# Patient Record
Sex: FEMALE | Race: WHITE | NOT HISPANIC OR LATINO | Employment: PART TIME | URBAN - METROPOLITAN AREA
[De-identification: names, ages, dates, MRNs, and addresses within clinical notes are randomized per-mention and may not be internally consistent; named-entity substitution may affect disease eponyms.]

---

## 2021-01-05 ENCOUNTER — TELEPHONE (OUTPATIENT)
Dept: FAMILY MEDICINE CLINIC | Facility: CLINIC | Age: 66
End: 2021-01-05

## 2021-01-05 DIAGNOSIS — Z12.12 SCREENING FOR COLORECTAL CANCER: Primary | ICD-10-CM

## 2021-01-05 DIAGNOSIS — Z12.11 SCREENING FOR COLORECTAL CANCER: Primary | ICD-10-CM

## 2021-01-05 NOTE — TELEPHONE ENCOUNTER
Spouse called in requesting a referral to have a colonoscopy done for the patient.    Spouse also said she needs to have the colonoscopy done before the ending of February. Spouse would like his and her colonoscopy done the same day.    Please call back to advise.    Best call back # [phone]

## 2023-09-15 ENCOUNTER — TELEPHONE (OUTPATIENT)
Dept: FAMILY MEDICINE CLINIC | Facility: CLINIC | Age: 68
End: 2023-09-15

## 2023-09-15 NOTE — TELEPHONE ENCOUNTER
"    Caller: Krystina Justin \"LALITA\"    Relationship to patient: Self    Best call back number: [phone]    Patient is needing: PATIENT IS RETURNING CALL ABOUT THE FORM SHE DROPPED OFF YESTERDAY, SHE STATES THAT THERE IS A FAX NUMBER ON THE BOTTOM OF THE FORM.         "

## 2024-04-11 ENCOUNTER — TELEPHONE (OUTPATIENT)
Dept: OBSTETRICS AND GYNECOLOGY | Age: 69
End: 2024-04-11

## 2024-04-11 DIAGNOSIS — Z12.31 VISIT FOR SCREENING MAMMOGRAM: Primary | ICD-10-CM

## 2024-04-17 RX ORDER — VENLAFAXINE 75 MG/1
75 TABLET ORAL EVERY EVENING
Qty: 90 TABLET | Refills: 0 | Status: SHIPPED | OUTPATIENT
Start: 2024-04-17

## 2024-04-17 NOTE — TELEPHONE ENCOUNTER
"    Caller: Krystina Justin \"LALITA\"    Relationship: Self    Best call back number: [phone]     Requested Prescriptions:   Requested Prescriptions     Pending Prescriptions Disp Refills    venlafaxine (EFFEXOR) 75 MG tablet       Sig: Take 1 tablet by mouth Every Evening.        Pharmacy where request should be sent: [facility]/PHARMACY #[id number] - [address] [phone] [facility] [phone]      Last office visit with prescribing clinician: 4/17/2023   Last telemedicine visit with prescribing clinician: Visit date not found   Next office visit with prescribing clinician: Visit date not found     Additional details provided by patient: PATIENT WAS GETTING FROM HER GYNECOLOGIST AND THEY ARE RETIRED. SHE HAS TWO WEEKS OF MEDICATION LEFT AND WILL NOT SEE NEW GYNECOLOGIST UNTIL JULY. SHE WOULD LIKE TO KNOW IF PCP WILL FILL FOR HER. PLEASE CALL IF SHE NEEDS APPT.     Does the patient have less than a 3 day supply:  [] Yes  [x] No    Would you like a call back once the refill request has been completed: [] Yes [x] No    If the office needs to give you a call back, can they leave a voicemail: [] Yes [x] No    Desmond Alas Rep   04/17/24 10:02 EDT         "

## 2024-05-09 ENCOUNTER — APPOINTMENT (OUTPATIENT)
Dept: WOMENS IMAGING | Facility: HOSPITAL | Age: 69
End: 2024-05-09
Payer: MEDICARE

## 2024-05-09 ENCOUNTER — HOSPITAL ENCOUNTER (OUTPATIENT)
Facility: HOSPITAL | Age: 69
Discharge: HOME | End: 2024-05-09
Payer: MEDICARE

## 2024-05-09 PROCEDURE — 77063 BREAST TOMOSYNTHESIS BI: CPT | Performed by: RADIOLOGY

## 2024-05-09 PROCEDURE — 77067 SCR MAMMO BI INCL CAD: CPT | Performed by: RADIOLOGY

## 2024-07-09 RX ORDER — VENLAFAXINE 75 MG/1
75 TABLET ORAL EVERY EVENING
Qty: 90 TABLET | Refills: 0 | Status: SHIPPED | OUTPATIENT
Start: 2024-07-09

## 2024-07-24 ENCOUNTER — OFFICE VISIT (OUTPATIENT)
Dept: OBSTETRICS AND GYNECOLOGY | Age: 69
End: 2024-07-24
Payer: MEDICARE

## 2024-07-24 VITALS
BODY MASS INDEX: 29.86 KG/M2 | DIASTOLIC BLOOD PRESSURE: 72 MMHG | SYSTOLIC BLOOD PRESSURE: 124 MMHG | HEIGHT: 68 IN | WEIGHT: 197 LBS

## 2024-07-24 DIAGNOSIS — Z01.419 WELL WOMAN EXAM WITH ROUTINE GYNECOLOGICAL EXAM: Primary | ICD-10-CM

## 2024-07-24 DIAGNOSIS — Z78.0 POSTMENOPAUSAL: ICD-10-CM

## 2024-07-24 DIAGNOSIS — Z12.4 SCREENING FOR CERVICAL CANCER: ICD-10-CM

## 2024-07-24 DIAGNOSIS — Z12.31 ENCOUNTER FOR SCREENING MAMMOGRAM FOR MALIGNANT NEOPLASM OF BREAST: ICD-10-CM

## 2024-07-24 DIAGNOSIS — N39.3 STRESS INCONTINENCE: ICD-10-CM

## 2024-07-24 DIAGNOSIS — Z85.3 PERSONAL HISTORY OF BREAST CANCER: ICD-10-CM

## 2024-07-24 NOTE — PROGRESS NOTES
Subjective     Chief Complaint   Patient presents with    Gynecologic Exam     New GYN, AE, last pap  normal, mg 2024, csy 2024, no dexa  Cc:  cyst inside vaginal, urine incontinent       History of Present Illness    Krystina Justin is a 69 y.o.  who presents for annual exam.    New  Mammogram UTD  She is postmenopausal - no vaginal bleeding  She has personal hx of breast cancer in , had negative genetic testing  Was getting pap every year   Has never had a dexa but takes calcium and vitamin D. Does not exercise regularly.   She noticed a bump on labia 2 days ago, not really painful  Has hx of KAYA. Has had for years, wears panty liner. Saw urology and they wanted to do a procedure elkin her but she didn't want to. Declines PT.    Obstetric History:  OB History          3    Para   3    Term   3            AB        Living             SAB        IAB        Ectopic        Molar        Multiple        Live Births                   Menstrual History:     No LMP recorded. Patient is postmenopausal.         Current contraception: post menopausal status  History of abnormal Pap smear: no  Received Gardasil immunization: no  Perform regular self breast exam: yes - .  Family history of uterine or ovarian cancer: no  Family History of colon cancer: no  Family history of breast cancer: yes - mother    Mammogram: up to date.  Colonoscopy: up to date.  DEXA: up to date.    Exercise: moderately active  Calcium/Vitamin D: uses supplements    The following portions of the patient's history were reviewed and updated as appropriate: allergies, current medications, past family history, past medical history, past social history, past surgical history, and problem list.    Review of Systems   Constitutional: Negative.    Respiratory: Negative.     Cardiovascular: Negative.    Gastrointestinal: Negative.    Genitourinary: Negative.         KAYA   Skin: Negative.         Vaginal bump    Psychiatric/Behavioral: Negative.             Objective   Physical Exam  Constitutional:       General: She is awake.      Appearance: Normal appearance. She is well-developed.   HENT:      Head: Normocephalic and atraumatic.      Nose: Nose normal.   Neck:      Thyroid: No thyroid mass, thyromegaly or thyroid tenderness.   Cardiovascular:      Rate and Rhythm: Normal rate and regular rhythm.      Pulses: Normal pulses.      Heart sounds: Normal heart sounds.   Pulmonary:      Effort: Pulmonary effort is normal.      Breath sounds: Normal breath sounds.   Chest:   Breasts:     Breasts are symmetrical.      Right: Normal. No swelling, bleeding, inverted nipple, mass, nipple discharge, skin change or tenderness.      Left: Normal. No swelling, bleeding, inverted nipple, mass, nipple discharge, skin change or tenderness.   Abdominal:      General: Abdomen is flat. Bowel sounds are normal.      Palpations: Abdomen is soft.      Tenderness: There is no abdominal tenderness.   Genitourinary:     General: Normal vulva.      Labia:         Right: No rash, tenderness, lesion or injury.         Left: No rash, tenderness, lesion or injury.       Urethra: No prolapse, urethral pain, urethral swelling or urethral lesion.      Vagina: Normal. No signs of injury. No vaginal discharge, erythema, tenderness, bleeding, lesions or prolapsed vaginal walls.      Cervix: Normal. No discharge, friability, lesion, erythema or cervical bleeding.      Uterus: Normal. Not enlarged, not tender and no uterine prolapse.       Adnexa: Right adnexa normal and left adnexa normal.        Right: No mass, tenderness or fullness.          Left: No mass, tenderness or fullness.        Rectum: Normal. No mass.          Comments: Small pimple, scant pus expressed.   Musculoskeletal:      Cervical back: Normal range of motion and neck supple.   Lymphadenopathy:      Upper Body:      Right upper body: No supraclavicular adenopathy.      Left upper body:  "No supraclavicular adenopathy.   Skin:     General: Skin is warm and dry.   Neurological:      General: No focal deficit present.      Mental Status: She is alert and oriented to person, place, and time.   Psychiatric:         Mood and Affect: Mood normal.         Behavior: Behavior normal. Behavior is cooperative.         Thought Content: Thought content normal.         Judgment: Judgment normal.         /72   Ht 172.7 cm (68\")   Wt 89.4 kg (197 lb)   BMI 29.95 kg/m²     Assessment & Plan   Diagnoses and all orders for this visit:    1. Well woman exam with routine gynecological exam (Primary)    2. Postmenopausal  -     DEXA Bone Density Axial; Future    3. Screening for cervical cancer  -     IGP, Apt HPV,rfx 16 / 18,45    4. Personal history of breast cancer    5. Encounter for screening mammogram for malignant neoplasm of breast  -     Mammo Screening Digital Tomosynthesis Bilateral With CAD; Future    6. Stress incontinence        All questions answered.  Breast self exam technique reviewed and patient encouraged to perform self-exam monthly.  Discussed healthy lifestyle modifications.  Recommended 30 minutes of aerobic exercise five times per week.  Discussed calcium needs to prevent osteoporosis.    -Pap done  -Apply neosporin to small pimple, appears to be resolving, follow up if persists/worsens  -Dexa ordered               "

## 2024-07-26 LAB
CYTOLOGIST CVX/VAG CYTO: NORMAL
CYTOLOGY CVX/VAG DOC CYTO: NORMAL
CYTOLOGY CVX/VAG DOC THIN PREP: NORMAL
DX ICD CODE: NORMAL
HPV I/H RISK 4 DNA CVX QL PROBE+SIG AMP: NEGATIVE
Lab: NORMAL
OTHER STN SPEC: NORMAL
STAT OF ADQ CVX/VAG CYTO-IMP: NORMAL

## 2024-08-08 ENCOUNTER — HOSPITAL ENCOUNTER (OUTPATIENT)
Facility: HOSPITAL | Age: 69
Discharge: HOME | End: 2024-08-08
Admitting: NURSE PRACTITIONER
Payer: MEDICARE

## 2024-08-08 DIAGNOSIS — Z78.0 POSTMENOPAUSAL: ICD-10-CM

## 2024-08-08 PROCEDURE — 77080 DXA BONE DENSITY AXIAL: CPT

## 2024-09-30 DIAGNOSIS — R00.0 TACHYCARDIA: ICD-10-CM

## 2024-09-30 DIAGNOSIS — N18.9 CHRONIC KIDNEY DISEASE, UNSPECIFIED CKD STAGE: ICD-10-CM

## 2024-09-30 DIAGNOSIS — Z00.00 MEDICARE ANNUAL WELLNESS VISIT, SUBSEQUENT: Primary | ICD-10-CM

## 2024-09-30 DIAGNOSIS — E78.00 ELEVATED LDL CHOLESTEROL LEVEL: ICD-10-CM

## 2024-10-02 LAB
ALBUMIN SERPL-MCNC: 4.5 G/DL (ref 3.5–5.2)
ALBUMIN/GLOB SERPL: 1.7 G/DL
ALP SERPL-CCNC: 62 U/L (ref 39–117)
ALT SERPL-CCNC: 13 U/L (ref 1–33)
APPEARANCE UR: CLEAR
AST SERPL-CCNC: 20 U/L (ref 1–32)
BILIRUB SERPL-MCNC: 0.3 MG/DL (ref 0–1.2)
BILIRUB UR QL STRIP: NEGATIVE
BUN SERPL-MCNC: 17 MG/DL (ref 8–23)
BUN/CREAT SERPL: 18.3 (ref 7–25)
CALCIUM SERPL-MCNC: 9.7 MG/DL (ref 8.6–10.5)
CHLORIDE SERPL-SCNC: 101 MMOL/L (ref 98–107)
CHOLEST SERPL-MCNC: 206 MG/DL (ref 0–200)
CO2 SERPL-SCNC: 25.2 MMOL/L (ref 22–29)
COLOR UR: YELLOW
CREAT SERPL-MCNC: 0.93 MG/DL (ref 0.57–1)
EGFRCR SERPLBLD CKD-EPI 2021: 67.1 ML/MIN/1.73
ERYTHROCYTE [DISTWIDTH] IN BLOOD BY AUTOMATED COUNT: 13.1 % (ref 12.3–15.4)
GLOBULIN SER CALC-MCNC: 2.7 GM/DL
GLUCOSE SERPL-MCNC: 96 MG/DL (ref 65–99)
GLUCOSE UR QL STRIP: NEGATIVE
HCT VFR BLD AUTO: 40.9 % (ref 34–46.6)
HDLC SERPL-MCNC: 47 MG/DL (ref 40–60)
HGB BLD-MCNC: 13.8 G/DL (ref 12–15.9)
HGB UR QL STRIP: NEGATIVE
KETONES UR QL STRIP: NEGATIVE
LDLC SERPL CALC-MCNC: 132 MG/DL (ref 0–100)
LDLC/HDLC SERPL: 2.74 {RATIO}
LEUKOCYTE ESTERASE UR QL STRIP: NEGATIVE
MCH RBC QN AUTO: 32.5 PG (ref 26.6–33)
MCHC RBC AUTO-ENTMCNC: 33.7 G/DL (ref 31.5–35.7)
MCV RBC AUTO: 96.5 FL (ref 79–97)
NITRITE UR QL STRIP: NEGATIVE
PH UR STRIP: 6 [PH] (ref 5–8)
PLATELET # BLD AUTO: 261 10*3/MM3 (ref 140–450)
POTASSIUM SERPL-SCNC: 4.5 MMOL/L (ref 3.5–5.2)
PROT SERPL-MCNC: 7.2 G/DL (ref 6–8.5)
PROT UR QL STRIP: NEGATIVE
RBC # BLD AUTO: 4.24 10*6/MM3 (ref 3.77–5.28)
SODIUM SERPL-SCNC: 138 MMOL/L (ref 136–145)
SP GR UR STRIP: 1.01 (ref 1–1.03)
TRIGL SERPL-MCNC: 151 MG/DL (ref 0–150)
TSH SERPL DL<=0.005 MIU/L-ACNC: 3.72 UIU/ML (ref 0.27–4.2)
UROBILINOGEN UR STRIP-MCNC: NORMAL MG/DL
VLDLC SERPL CALC-MCNC: 27 MG/DL (ref 5–40)
WBC # BLD AUTO: 4.7 10*3/MM3 (ref 3.4–10.8)

## 2024-10-03 ENCOUNTER — OFFICE VISIT (OUTPATIENT)
Dept: FAMILY MEDICINE CLINIC | Facility: CLINIC | Age: 69
End: 2024-10-03
Payer: MEDICARE

## 2024-10-03 VITALS
HEIGHT: 68 IN | HEART RATE: 68 BPM | WEIGHT: 208 LBS | BODY MASS INDEX: 31.52 KG/M2 | OXYGEN SATURATION: 98 % | DIASTOLIC BLOOD PRESSURE: 70 MMHG | SYSTOLIC BLOOD PRESSURE: 120 MMHG | TEMPERATURE: 97 F

## 2024-10-03 DIAGNOSIS — F32.A ANXIETY AND DEPRESSION: ICD-10-CM

## 2024-10-03 DIAGNOSIS — Z00.00 MEDICARE ANNUAL WELLNESS VISIT, SUBSEQUENT: Primary | ICD-10-CM

## 2024-10-03 DIAGNOSIS — F41.9 ANXIETY AND DEPRESSION: ICD-10-CM

## 2024-10-03 PROBLEM — Z12.11 COLON CANCER SCREENING: Status: ACTIVE | Noted: 2023-09-26

## 2024-10-03 PROBLEM — N18.9 CHRONIC KIDNEY DISEASE: Status: RESOLVED | Noted: 2020-07-24 | Resolved: 2024-10-03

## 2024-10-03 PROBLEM — Z86.16 PERSONAL HISTORY OF COVID-19: Status: RESOLVED | Noted: 2022-04-14 | Resolved: 2024-10-03

## 2024-10-03 PROBLEM — R00.0 TACHYCARDIA: Status: RESOLVED | Noted: 2022-12-02 | Resolved: 2024-10-03

## 2024-10-03 PROCEDURE — 1160F RVW MEDS BY RX/DR IN RCRD: CPT | Performed by: FAMILY MEDICINE

## 2024-10-03 PROCEDURE — 1170F FXNL STATUS ASSESSED: CPT | Performed by: FAMILY MEDICINE

## 2024-10-03 PROCEDURE — 1159F MED LIST DOCD IN RCRD: CPT | Performed by: FAMILY MEDICINE

## 2024-10-03 PROCEDURE — G0439 PPPS, SUBSEQ VISIT: HCPCS | Performed by: FAMILY MEDICINE

## 2024-10-03 PROCEDURE — 1125F AMNT PAIN NOTED PAIN PRSNT: CPT | Performed by: FAMILY MEDICINE

## 2024-10-03 RX ORDER — VENLAFAXINE 75 MG/1
75 TABLET ORAL EVERY EVENING
Qty: 90 TABLET | Refills: 3 | Status: SHIPPED | OUTPATIENT
Start: 2024-10-03

## 2024-10-03 NOTE — PROGRESS NOTES
Subjective   The ABCs of the Annual Wellness Visit  Medicare Wellness Visit      Krystina Justin is a 69 y.o. patient who presents for a Medicare Wellness Visit.    The following portions of the patient's history were reviewed and   updated as appropriate: allergies, current medications, past family history, past medical history, past social history, past surgical history, and problem list.    Compared to one year ago, the patient's physical   health is the same.  Compared to one year ago, the patient's mental   health is the same.    Recent Hospitalizations:  She was not admitted to the hospital during the last year.     Current Medical Providers:  Patient Care Team:  Rudy Mccartney MD as PCP - General (Family Medicine)  Fabian Canada MD as Consulting Physician (Orthopedic Surgery)  Jagdeep Cheek OD (Optometry)  Vibha Castillo APRN as Nurse Practitioner (Obstetrics and Gynecology)  Raul Mcclellan MD as Consulting Physician (Orthopedic Surgery)  Osmany Ortega MD as Consulting Physician (General Surgery)  Giuliano Singh as Physical Therapy Assistant    Outpatient Medications Prior to Visit   Medication Sig Dispense Refill    calcium carbonate (OS-YOLANDA) 600 MG tablet Take 1 tablet by mouth Daily. 2 TABS EACH DOSE      MAGNESIUM PO Take  by mouth Daily.      melatonin 5 MG tablet tablet Take 1 tablet by mouth.      Misc Natural Products (GLUCOSAMINE CHONDROITIN MSM) tablet Take 2 tablets by mouth Daily. ON HOLD FOR SX 6/1/16      venlafaxine (EFFEXOR) 75 MG tablet Take 1 tablet by mouth Every Evening. 90 tablet 0     No facility-administered medications prior to visit.     No opioid medication identified on active medication list. I have reviewed chart for other potential  high risk medication/s and harmful drug interactions in the elderly.      Aspirin is not on active medication list.  Aspirin use is not indicated based on review of current medical condition/s. Risk of harm outweighs  "potential benefits.  .    Patient Active Problem List   Diagnosis    Medicare annual wellness visit, subsequent    Menopause    Atypical lobular hyperplasia of right breast    Breast cancer, left breast    Renal cyst    Family history of thyroid cancer    Colon cancer screening     Advance Care Planning Advance Directive is not on file.  ACP discussion was held with the patient during this visit. Patient has an advance directive (not in EMR), copy requested.            Objective   Vitals:    10/03/24 1033   BP: 120/70   BP Location: Left arm   Patient Position: Sitting   Cuff Size: Adult   Pulse: 68   Temp: 97 °F (36.1 °C)   SpO2: 98%   Weight: 94.3 kg (208 lb)   Height: 172.7 cm (68\")   PainSc:   2   PainLoc: Shoulder       Estimated body mass index is 31.63 kg/m² as calculated from the following:    Height as of this encounter: 172.7 cm (68\").    Weight as of this encounter: 94.3 kg (208 lb).            Does the patient have evidence of cognitive impairment? No  Lab Results   Component Value Date    CHLPL 206 (H) 10/01/2024    TRIG 151 (H) 10/01/2024    HDL 47 10/01/2024     (H) 10/01/2024    VLDL 27 10/01/2024                                                                                               Health  Risk Assessment    Smoking Status:  Social History     Tobacco Use   Smoking Status Former    Current packs/day: 0.00    Types: Cigarettes    Start date: 1970    Quit date: 1990    Years since quittin.7    Passive exposure: Yes   Smokeless Tobacco Never   Tobacco Comments    a few a month hanging out with the girls     Alcohol Consumption:  Social History     Substance and Sexual Activity   Alcohol Use Yes    Alcohol/week: 21.0 standard drinks of alcohol    Types: 21 Glasses of wine per week       Fall Risk Screen  STEADI Fall Risk Assessment was completed, and patient is at LOW risk for falls.Assessment completed on:10/3/2024    Depression Screening:      10/3/2024    10:00 AM "   PHQ-2/PHQ-9 Depression Screening   Little Interest or Pleasure in Doing Things 0-->not at all   Feeling Down, Depressed or Hopeless 0-->not at all   PHQ-9: Brief Depression Severity Measure Score 0     Health Habits and Functional and Cognitive Screening:      10/3/2024    10:00 AM   Functional & Cognitive Status   Do you have difficulty preparing food and eating? No   Do you have difficulty bathing yourself, getting dressed or grooming yourself? No   Do you have difficulty using the toilet? No   Do you have difficulty moving around from place to place? No   Do you have trouble with steps or getting out of a bed or a chair? No   Current Diet Well Balanced Diet   Dental Exam Not up to date   Eye Exam Up to date   Exercise (times per week) 0 times per week   Do you need help using the phone?  No   Are you deaf or do you have serious difficulty hearing?  No   Do you need help to go to places out of walking distance? No   Do you need help shopping? No   Do you need help preparing meals?  No   Do you need help with housework?  No   Do you need help with laundry? No   Do you need help taking your medications? No   Do you need help managing money? No   Do you ever drive or ride in a car without wearing a seat belt? No   Have you felt unusual stress, anger or loneliness in the last month? No   Who do you live with? Spouse   If you need help, do you have trouble finding someone available to you? No   Do you have difficulty concentrating, remembering or making decisions? No           Age-appropriate Screening Schedule:  Refer to the list below for future screening recommendations based on patient's age, sex and/or medical conditions. Orders for these recommended tests are listed in the plan section. The patient has been provided with a written plan.    Health Maintenance List  Health Maintenance   Topic Date Due    ANNUAL WELLNESS VISIT  04/17/2024    COVID-19 Vaccine (3 - 2023-24 season) 10/05/2024 (Originally 9/1/2024)     INFLUENZA VACCINE  03/31/2025 (Originally 8/1/2024)    HEPATITIS C SCREENING  10/03/2025 (Originally 8/25/2016)    ZOSTER VACCINE (1 of 2) 10/03/2025 (Originally 5/23/2006)    BMI FOLLOWUP  10/03/2025    MAMMOGRAM  05/09/2026    DXA SCAN  08/08/2026    PAP SMEAR  07/24/2027    TDAP/TD VACCINES (2 - Td or Tdap) 03/14/2032    COLORECTAL CANCER SCREENING  01/03/2034    Pneumococcal Vaccine 65+  Completed                                                                                                                                                CMS Preventative Services Quick Reference  Risk Factors Identified During Encounter  Immunizations Discussed/Encouraged: Influenza, Shingrix, COVID19, and RSV (Respiratory Syncytial Virus)    The above risks/problems have been discussed with the patient.  Pertinent information has been shared with the patient in the After Visit Summary.  An After Visit Summary and PPPS were made available to the patient.    Follow Up:   Next Medicare Wellness visit to be scheduled in 1 year.     Assessment & Plan  Medicare annual wellness visit, subsequent    Anxiety and depression  Patient's depression is a recurrent episode that is mild without psychosis. Depression is in full remission and stable.    Plan:   Continue current medication therapy     Followup in 1 year.      New Medications Ordered This Visit   Medications    venlafaxine (EFFEXOR) 75 MG tablet     Sig: Take 1 tablet by mouth Every Evening.     Dispense:  90 tablet     Refill:  3     Taya overall doing well  Has been busy helping plan her daughter's wedding  Labs are okay  Moved to a different house  Does not want any vaccines  See her back in a year    Follow Up:   No follow-ups on file.

## 2025-01-28 DIAGNOSIS — F32.A ANXIETY AND DEPRESSION: ICD-10-CM

## 2025-01-28 DIAGNOSIS — F41.9 ANXIETY AND DEPRESSION: ICD-10-CM

## 2025-01-28 RX ORDER — VENLAFAXINE 75 MG/1
75 TABLET ORAL EVERY EVENING
Qty: 90 TABLET | Refills: 0 | Status: SHIPPED | OUTPATIENT
Start: 2025-01-28

## 2025-04-25 ENCOUNTER — TELEPHONE (OUTPATIENT)
Dept: FAMILY MEDICINE CLINIC | Facility: CLINIC | Age: 70
End: 2025-04-25

## 2025-04-25 NOTE — TELEPHONE ENCOUNTER
"  Caller: Krystina Justin \"LALITA\"    Relationship: Self    Best call back number: [phone]      What was the call regarding: PATIENT IS AWAY ON VACATION IN [facility]     PATIENT WENT TO [facility] AND THEY DIAGNOSED HER WITH A UTI AND FEVER     PATIENT WAS GIVEN NITROFURANTION MONO - MCR 100MG     PATIENT STATES THIS IS MAKING HER SICK AND SHE DOESN'T WANT TO TAKE IT    PATIENT WANTS TO SEE WHAT DR BARRON RECOMMENDS     PATIENT WILL BE HOME MONDAY 4/28/25    PLEASE GIVE CALLBACK WITH RECOMMENDATION       "

## 2025-04-27 DIAGNOSIS — F32.A ANXIETY AND DEPRESSION: ICD-10-CM

## 2025-04-27 DIAGNOSIS — F41.9 ANXIETY AND DEPRESSION: ICD-10-CM

## 2025-04-28 ENCOUNTER — TELEPHONE (OUTPATIENT)
Dept: FAMILY MEDICINE CLINIC | Facility: CLINIC | Age: 70
End: 2025-04-28

## 2025-04-28 RX ORDER — VENLAFAXINE 75 MG/1
75 TABLET ORAL EVERY EVENING
Qty: 90 TABLET | Refills: 1 | Status: SHIPPED | OUTPATIENT
Start: 2025-04-28

## 2025-04-28 NOTE — TELEPHONE ENCOUNTER
Spoke to patient.   She verbally understood.   Patient stated that she did not need a work excuse.

## 2025-06-19 ENCOUNTER — APPOINTMENT (OUTPATIENT)
Dept: WOMENS IMAGING | Facility: HOSPITAL | Age: 70
End: 2025-06-19
Payer: MEDICARE

## 2025-06-19 PROCEDURE — 77063 BREAST TOMOSYNTHESIS BI: CPT | Performed by: RADIOLOGY

## 2025-06-19 PROCEDURE — 77067 SCR MAMMO BI INCL CAD: CPT | Performed by: RADIOLOGY

## 2025-06-30 DIAGNOSIS — N64.89 BREAST ASYMMETRY: Primary | ICD-10-CM

## 2025-07-09 ENCOUNTER — APPOINTMENT (OUTPATIENT)
Dept: WOMENS IMAGING | Facility: HOSPITAL | Age: 70
End: 2025-07-09
Payer: MEDICARE

## 2025-07-09 PROCEDURE — 77065 DX MAMMO INCL CAD UNI: CPT | Performed by: RADIOLOGY

## 2025-07-09 PROCEDURE — 76642 ULTRASOUND BREAST LIMITED: CPT | Performed by: RADIOLOGY

## 2025-07-09 PROCEDURE — G0279 TOMOSYNTHESIS, MAMMO: HCPCS | Performed by: RADIOLOGY

## 2025-07-11 DIAGNOSIS — R92.8 OTHER ABNORMAL AND INCONCLUSIVE FINDINGS ON DIAGNOSTIC IMAGING OF BREAST: ICD-10-CM

## 2025-07-11 DIAGNOSIS — N64.89 BREAST ASYMMETRY: Primary | ICD-10-CM

## 2025-07-17 ENCOUNTER — LAB REQUISITION (OUTPATIENT)
Dept: LAB | Facility: HOSPITAL | Age: 70
End: 2025-07-17
Payer: MEDICARE

## 2025-07-17 ENCOUNTER — APPOINTMENT (OUTPATIENT)
Dept: WOMENS IMAGING | Facility: HOSPITAL | Age: 70
End: 2025-07-17
Payer: MEDICARE

## 2025-07-17 DIAGNOSIS — N63.0 UNSPECIFIED LUMP IN UNSPECIFIED BREAST: ICD-10-CM

## 2025-07-17 PROCEDURE — 88341 IMHCHEM/IMCYTCHM EA ADD ANTB: CPT | Performed by: PHYSICIAN ASSISTANT

## 2025-07-17 PROCEDURE — 19083 BX BREAST 1ST LESION US IMAG: CPT | Performed by: RADIOLOGY

## 2025-07-17 PROCEDURE — 88360 TUMOR IMMUNOHISTOCHEM/MANUAL: CPT | Performed by: PHYSICIAN ASSISTANT

## 2025-07-17 PROCEDURE — A4648 IMPLANTABLE TISSUE MARKER: HCPCS | Performed by: RADIOLOGY

## 2025-07-17 PROCEDURE — 88342 IMHCHEM/IMCYTCHM 1ST ANTB: CPT | Performed by: PHYSICIAN ASSISTANT

## 2025-07-17 PROCEDURE — 88305 TISSUE EXAM BY PATHOLOGIST: CPT | Performed by: PHYSICIAN ASSISTANT

## 2025-07-22 LAB
CYTO UR: NORMAL
DX PRELIMINARY: NORMAL
LAB AP CASE REPORT: NORMAL
LAB AP CLINICAL INFORMATION: NORMAL
LAB AP INTRADEPARTMENTAL CONSULT: NORMAL
LAB AP SPECIAL STAINS: NORMAL
LAB AP SYNOPTIC CHECKLIST: NORMAL
PATH REPORT.FINAL DX SPEC: NORMAL
PATH REPORT.GROSS SPEC: NORMAL

## 2025-07-31 ENCOUNTER — OFFICE VISIT (OUTPATIENT)
Dept: OBSTETRICS AND GYNECOLOGY | Age: 70
End: 2025-07-31
Payer: MEDICARE

## 2025-07-31 VITALS
DIASTOLIC BLOOD PRESSURE: 84 MMHG | WEIGHT: 182 LBS | BODY MASS INDEX: 27.58 KG/M2 | HEIGHT: 68 IN | SYSTOLIC BLOOD PRESSURE: 140 MMHG

## 2025-07-31 DIAGNOSIS — Z01.419 ENCOUNTER FOR GYNECOLOGICAL EXAMINATION: Primary | ICD-10-CM

## 2025-07-31 DIAGNOSIS — C50.912 MALIGNANT NEOPLASM OF LEFT FEMALE BREAST, UNSPECIFIED ESTROGEN RECEPTOR STATUS, UNSPECIFIED SITE OF BREAST: ICD-10-CM

## 2025-07-31 DIAGNOSIS — Z87.42 HISTORY OF ABNORMAL CERVICAL PAP SMEAR: ICD-10-CM

## 2025-07-31 NOTE — PROGRESS NOTES
Subjective       History of Present Illness    Chief Complaint   Patient presents with    Gynecologic Exam     Ae last pap (-) Hpv (-) 24 Mg 25 dexa 24 scope 1/3/24 cc: no complaints today        Krystina Justin is a 70 y.o. female who presents for annual exam.  She is postmenopausal - no vaginal bleeding  She has personal hx of breast cancer in , had negative genetic testing. Has a new left sided breast cancer again.  Plans another lumpectomy.  Raul Ortega is her surgeon.   Surgery not scheduled yet- unsure if she will need additional treatment.    She has negative genetic testing  Sees oncology next week- Bonnie- may need chemo this time  Wants pap every year, has a remote hx of abnormals        OB History    Para Term  AB Living   3 3 3   3   SAB IAB Ectopic Molar Multiple Live Births        3      # Outcome Date GA Lbr Reji/2nd Weight Sex Type Anes PTL Lv   3 Term     F CS-Unspec   LINDSAY   2 Term     F Vag-Spont EPI  LINDSAY   1 Term     M Vag-Spont EPI  LINDSAY       The following portions of the patient's history were reviewed and updated as appropriate: allergies, current medications, past family history, past medical history, past social history, past surgical history and problem list.    No LMP recorded. Patient is postmenopausal.     Current contraception: post menopausal status  History of abnormal Pap smear: remote hx of abnormals  Received Gardasil immunization: no  Perform regular self breast exam: yes - .  Family history of uterine or ovarian cancer: no  Family History of colon cancer: no  Family history of breast cancer: yes - mother     Mammogram: up to date.  Colonoscopy: up to date.  DEXA: up to date    Social History    Tobacco Use      Smoking status: Former        Packs/day: 0.00        Types: Cigarettes        Start date: 1970        Quit date: 1990        Years since quittin.6        Passive exposure: Yes      Smokeless tobacco: Never      Tobacco  comments: a few a month hanging out with the girls    Past Medical History:   Diagnosis Date    Cancer     BREAST-LEFT    Breast cancer 2025    Left    Hypertension     DURING PREGNANCY    PONV (postoperative nausea and vomiting)       Past Surgical History:   Procedure Laterality Date    BREAST SURGERY Left     LUMPECTOMY    BREAST SURGERY Right 2015    PRE-CANCEROUS CELLS    BUNIONECTOMY Left 2016    Procedure: LT CHEVRON BUNIONECTOMY WITH LT 2ND MTP ARTHROPLASTY CONDOLECTOMY WITH WEIL OSTEOTOMY AND DOUBLE OSTEOTOMY;  Surgeon: Fabian Canada MD;  Location: [facility] OR [facility];  Service:     CATARACT EXTRACTION, BILATERAL      .     SECTION PRIMARY      COLONOSCOPY      Normal-Dr. Raul Ortega    JOINT REPLACEMENT  2021    TOTAL HIP ARTHROPLASTY Left 2023    WISDOM TOOTH EXTRACTION          The following portions of the patient's history were reviewed and updated as appropriate: allergies, current medications, past family history, past medical history, past social history, past surgical history, and problem list.    Review of Systems   Constitutional: Negative.    HENT: Negative.     Eyes: Negative.    Respiratory: Negative.     Cardiovascular: Negative.    Gastrointestinal: Negative.    Endocrine: Negative.    Genitourinary: Negative.    Musculoskeletal: Negative.    Skin: Negative.    Allergic/Immunologic: Negative.    Neurological: Negative.    Hematological: Negative.    Psychiatric/Behavioral: Negative.           Objective   Physical Exam  Vitals reviewed.   Constitutional:       Appearance: Normal appearance. She is well-developed.   HENT:      Head: Normocephalic.   Neck:      Thyroid: No thyroid mass.   Cardiovascular:      Rate and Rhythm: Normal rate and regular rhythm.      Heart sounds: Normal heart sounds. No murmur heard.  Pulmonary:      Effort: Pulmonary effort is normal.      Breath sounds: Normal breath sounds.   Chest:   Breasts:     Right: No mass,  "nipple discharge, skin change or tenderness.      Left: Mass present. No nipple discharge, skin change or tenderness.          Comments: Left breast lump at 1 o clock, about 1.5cm diameter, + mobility  Abdominal:      Palpations: Abdomen is soft. There is no mass.      Tenderness: There is no abdominal tenderness.   Genitourinary:     General: Normal vulva.      Labia:         Right: No rash or lesion.         Left: No rash or lesion.       Vagina: Normal.      Cervix: Normal. No cervical motion tenderness, discharge or friability.      Uterus: Normal. Not tender.       Adnexa: Right adnexa normal and left adnexa normal.        Right: No mass or tenderness.          Left: No mass or tenderness.        Rectum: Normal.   Skin:     General: Skin is warm.   Neurological:      General: No focal deficit present.      Mental Status: She is alert and oriented to person, place, and time.   Psychiatric:         Mood and Affect: Mood normal.         Behavior: Behavior normal.         /84   Ht 172.7 cm (67.99\")   Wt 82.6 kg (182 lb)   BMI 27.68 kg/m²     Assessment & Plan   Diagnoses and all orders for this visit:    1. Encounter for gynecological examination (Primary)  -     IGP, Apt HPV,rfx 16 / 18,45    2. History of abnormal cervical Pap smear    3. Malignant neoplasm of left female breast, unspecified estrogen receptor status, unspecified site of breast          Breast self exam technique reviewed and patient encouraged to perform self-exam monthly.  Discussed healthy lifestyle modifications.  Pap smear done with HPV   Recommended 30 minutes of aerobic exercise five times per week.  Discussed calcium needs to prevent osteoporosis         "

## 2025-08-05 LAB
CYTOLOGIST CVX/VAG CYTO: NORMAL
CYTOLOGY CVX/VAG DOC CYTO: NORMAL
CYTOLOGY CVX/VAG DOC THIN PREP: NORMAL
DX ICD CODE: NORMAL
HPV I/H RISK 4 DNA CVX QL PROBE+SIG AMP: NEGATIVE
OTHER STN SPEC: NORMAL
SERVICE CMNT-IMP: NORMAL
STAT OF ADQ CVX/VAG CYTO-IMP: NORMAL